# Patient Record
Sex: MALE | URBAN - METROPOLITAN AREA
[De-identification: names, ages, dates, MRNs, and addresses within clinical notes are randomized per-mention and may not be internally consistent; named-entity substitution may affect disease eponyms.]

---

## 2020-05-05 ENCOUNTER — APPOINTMENT (RX ONLY)
Dept: URBAN - METROPOLITAN AREA CLINIC 84 | Facility: CLINIC | Age: 65
Setting detail: DERMATOLOGY
End: 2020-05-05

## 2020-05-05 VITALS — TEMPERATURE: 98.4 F

## 2020-05-05 DIAGNOSIS — M31.0 HYPERSENSITIVITY ANGIITIS: ICD-10-CM | Status: RESOLVING

## 2020-05-05 DIAGNOSIS — L98429 CHRONIC ULCER OF OTHER SPECIFIED SITES: ICD-10-CM

## 2020-05-05 DIAGNOSIS — D22 MELANOCYTIC NEVI: ICD-10-CM

## 2020-05-05 DIAGNOSIS — L98419 CHRONIC ULCER OF OTHER SPECIFIED SITES: ICD-10-CM

## 2020-05-05 PROBLEM — L97.529 NON-PRESSURE CHRONIC ULCER OF OTHER PART OF LEFT FOOT WITH UNSPECIFIED SEVERITY: Status: ACTIVE | Noted: 2020-05-05

## 2020-05-05 PROBLEM — L97.519 NON-PRESSURE CHRONIC ULCER OF OTHER PART OF RIGHT FOOT WITH UNSPECIFIED SEVERITY: Status: ACTIVE | Noted: 2020-05-05

## 2020-05-05 PROBLEM — D22.9 MELANOCYTIC NEVI, UNSPECIFIED: Status: ACTIVE | Noted: 2020-05-05

## 2020-05-05 PROCEDURE — 99203 OFFICE O/P NEW LOW 30 MIN: CPT

## 2020-05-05 PROCEDURE — ? ADDITIONAL NOTES

## 2020-05-05 PROCEDURE — ? COUNSELING

## 2020-05-05 ASSESSMENT — LOCATION SIMPLE DESCRIPTION DERM
LOCATION SIMPLE: PLANTAR SURFACE OF LEFT 1ST TOE
LOCATION SIMPLE: RIGHT PLANTAR SURFACE
LOCATION SIMPLE: PLANTAR SURFACE OF LEFT 4TH TOE
LOCATION SIMPLE: RIGHT PRETIBIAL REGION
LOCATION SIMPLE: PLANTAR SURFACE OF RIGHT 1ST TOE
LOCATION SIMPLE: LEFT PLANTAR SURFACE
LOCATION SIMPLE: LEFT PRETIBIAL REGION

## 2020-05-05 ASSESSMENT — LOCATION DETAILED DESCRIPTION DERM
LOCATION DETAILED: LEFT LATERAL PLANTAR 1ST TOE
LOCATION DETAILED: LEFT MEDIAL PLANTAR 4TH TOE
LOCATION DETAILED: LEFT PROXIMAL PRETIBIAL REGION
LOCATION DETAILED: RIGHT PROXIMAL PRETIBIAL REGION
LOCATION DETAILED: LEFT PLANTAR FOREFOOT OVERLYING 3RD METATARSAL
LOCATION DETAILED: RIGHT MEDIAL PLANTAR 1ST TOE
LOCATION DETAILED: RIGHT PLANTAR FOREFOOT OVERLYING 2ND METATARSAL

## 2020-05-05 ASSESSMENT — LOCATION ZONE DERM
LOCATION ZONE: FEET
LOCATION ZONE: TOE
LOCATION ZONE: LEG

## 2020-05-05 NOTE — HPI: ULCER
Is This A New Presentation, Or A Follow-Up?: Ulcers
Additional History: Began after an infection to bilateral feet.  He currently is using Gentamicin ointment twice daily and is following up with a podiatrist twice a month.

## 2020-05-05 NOTE — HPI: RASH
What Type Of Note Output Would You Prefer (Optional)?: Bullet Format
Is This A New Presentation, Or A Follow-Up?: Rash
Additional History: Patient has been washing with Palmolive soap and was given oral Augmentin with positive improvement.
Review of Systems: limited d/t ID  CONSTITUTIONAL: no fever  EYES: no photophobia, no blurred vision  ENT: no ear pain, no nasal discharge  RESPIRATORY: no shortness of breath, no cough  CARDIAC: no chest pain, no palpitations  GI: no abd pain, no nausea, no vomiting, no diarrhea, no constipation,   : no dysuria; no hematuria,   MUSCULOSKELETAL: +R leg pain   NEUROLOGIC: no headache,

## 2020-05-05 NOTE — PROCEDURE: ADDITIONAL NOTES
Detail Level: Simple
Additional Notes: Elevate legs daily. will contact pcp to obtain labs drawn for this rash; no involvement of buttock noted; advised that since lesions are resolving, no indication at this point for oral systemic therapy- no identifiable trigger noted on history- no new medications or recent illness\\n\\n\\n
Additional Notes: Continue Gentamicin ointment, however, highly recommend Duoderm pads change every 48 hours.\\nFollow up with Podiatrist as scheduled, also recommend following up with a wound care specialist for consultation for multidisciplinary approach- possible debridement required\\n\\Judi future, can consider triad paste with duoderm dressing; advised to use pressure offloading shoes that he has from podiatry\\n\\nelevate legs
Additional Notes: Patient consent was obtained to proceed with the visit and recommended plan of care after discussion of all risks and benefits, including the risks of COVID-19 exposure.